# Patient Record
Sex: MALE | Race: WHITE | Employment: OTHER | URBAN - METROPOLITAN AREA
[De-identification: names, ages, dates, MRNs, and addresses within clinical notes are randomized per-mention and may not be internally consistent; named-entity substitution may affect disease eponyms.]

---

## 2017-07-19 ENCOUNTER — HOSPITAL ENCOUNTER (EMERGENCY)
Facility: HOSPITAL | Age: 32
Discharge: HOME | End: 2017-07-19
Attending: EMERGENCY MEDICINE

## 2017-07-19 ENCOUNTER — APPOINTMENT (OUTPATIENT)
Dept: GENERAL RADIOLOGY | Facility: HOSPITAL | Age: 32
End: 2017-07-19

## 2017-07-19 VITALS
TEMPERATURE: 99 F | HEART RATE: 84 BPM | WEIGHT: 155 LBS | SYSTOLIC BLOOD PRESSURE: 115 MMHG | OXYGEN SATURATION: 98 % | DIASTOLIC BLOOD PRESSURE: 66 MMHG | RESPIRATION RATE: 16 BRPM

## 2017-07-19 DIAGNOSIS — S29.011A STRAIN OF CHEST WALL, INITIAL ENCOUNTER: Primary | ICD-10-CM

## 2017-07-19 PROCEDURE — 99283 EMERGENCY DEPT VISIT LOW MDM: CPT

## 2017-07-19 PROCEDURE — 71020 XR CHEST PA + LAT CHEST (CPT=71020): CPT | Performed by: EMERGENCY MEDICINE

## 2017-07-19 RX ORDER — IBUPROFEN 600 MG/1
600 TABLET ORAL EVERY 8 HOURS PRN
Qty: 20 TABLET | Refills: 0 | Status: SHIPPED | OUTPATIENT
Start: 2017-07-19 | End: 2017-10-31 | Stop reason: ALTCHOICE

## 2017-07-19 NOTE — ED PROVIDER NOTES
Patient Seen in: [facility] Emergency Department    History   Patient presents with:  Pain (neurologic)    Stated Complaint: back and chest wall pain.  Pain increases on movement    HPI    Patient is a 29-year-old male, presenting for evaluation of l is pink and moist.  NECK: Neck is supple and nontender. The trachea is midline. LUNGS: Lungs are clear to auscultation bilaterally, respirations are unlabored. No rib point tenderness, no crepitus. HEART: Regular rate and rhythm.  There are no rubs or g continued as tolerated. Close outpatient follow-up with his primary care physician was encouraged. Patient was invited to return for reevaluation of any problems, worsening symptoms, or as discussed.     Patient verbalizes understanding and is comfort

## 2017-07-19 NOTE — ED INITIAL ASSESSMENT (HPI)
States approx 90 min ago he was lifting a heavy cabinet at home when he felt sudden left chest and rib pain. C/o pain with movement and coughing.  H/o injury to same area yrs ago but was never checked

## 2017-10-31 ENCOUNTER — APPOINTMENT (OUTPATIENT)
Dept: GENERAL RADIOLOGY | Facility: HOSPITAL | Age: 32
End: 2017-10-31
Attending: EMERGENCY MEDICINE

## 2017-10-31 ENCOUNTER — HOSPITAL ENCOUNTER (EMERGENCY)
Facility: HOSPITAL | Age: 32
Discharge: HOME | End: 2017-10-31
Attending: EMERGENCY MEDICINE

## 2017-10-31 VITALS
HEART RATE: 95 BPM | DIASTOLIC BLOOD PRESSURE: 88 MMHG | RESPIRATION RATE: 16 BRPM | WEIGHT: 145 LBS | TEMPERATURE: 97 F | SYSTOLIC BLOOD PRESSURE: 137 MMHG | OXYGEN SATURATION: 97 % | BODY MASS INDEX: 23.3 KG/M2 | HEIGHT: 66 IN

## 2017-10-31 DIAGNOSIS — S92.505A CLOSED NONDISPLACED FRACTURE OF PHALANX OF LESSER TOE OF LEFT FOOT, UNSPECIFIED PHALANX, INITIAL ENCOUNTER: Primary | ICD-10-CM

## 2017-10-31 PROCEDURE — 99283 EMERGENCY DEPT VISIT LOW MDM: CPT

## 2017-10-31 PROCEDURE — 73660 X-RAY EXAM OF TOE(S): CPT | Performed by: EMERGENCY MEDICINE

## 2017-10-31 PROCEDURE — 73630 X-RAY EXAM OF FOOT: CPT | Performed by: EMERGENCY MEDICINE

## 2017-10-31 PROCEDURE — 28510 TREATMENT OF TOE FRACTURE: CPT

## 2017-10-31 PROCEDURE — 99282 EMERGENCY DEPT VISIT SF MDM: CPT

## 2017-10-31 NOTE — ED PROVIDER NOTES
Patient Seen in: [facility] Emergency Department    History   Patient presents with:  Lower Extremity Injury (musculoskeletal)    Stated Complaint: toe injury    HPI    This is a 26-year-old male complaining of left foot and fourth toe pain since ye bit of erythema onto the dorsum of the foot. There is tenderness to palpation over the third and fourth metatarsal tips. No other bony point tenderness over the left foot or ankle. CMS is intact. SKIN: Well perfused, without cyanosis. No rashes.   NEUR There is a intra-articular fracture involving the distal metaphysis of the fourth proximal phalanx. There is a small avulsion fracture identified along the medial aspect of the base of the fourth middle phalanx.  There is a comminuted fracture to the mid to

## 2017-10-31 NOTE — ED INITIAL ASSESSMENT (HPI)
Pt had a large piece of wood fall onto his foot yesterday. Pt has swelling and discoloration of the left 4th toe with difficulty walking on it.

## 2024-05-29 ENCOUNTER — HOSPITAL ENCOUNTER (EMERGENCY)
Facility: HOSPITAL | Age: 39
Discharge: HOME | End: 2024-05-29
Attending: STUDENT IN AN ORGANIZED HEALTH CARE EDUCATION/TRAINING PROGRAM

## 2024-05-29 VITALS
DIASTOLIC BLOOD PRESSURE: 76 MMHG | OXYGEN SATURATION: 98 % | SYSTOLIC BLOOD PRESSURE: 107 MMHG | TEMPERATURE: 98 F | RESPIRATION RATE: 18 BRPM | HEART RATE: 60 BPM

## 2024-05-29 DIAGNOSIS — S61.211A LACERATION OF LEFT INDEX FINGER WITHOUT FOREIGN BODY WITHOUT DAMAGE TO NAIL, INITIAL ENCOUNTER: Primary | ICD-10-CM

## 2024-05-29 PROCEDURE — 12001 RPR S/N/AX/GEN/TRNK 2.5CM/<: CPT

## 2024-05-29 PROCEDURE — 99283 EMERGENCY DEPT VISIT LOW MDM: CPT

## 2024-05-29 PROCEDURE — 90471 IMMUNIZATION ADMIN: CPT

## 2024-05-29 PROCEDURE — 99284 EMERGENCY DEPT VISIT MOD MDM: CPT

## 2024-05-29 RX ORDER — CEPHALEXIN 500 MG/1
500 CAPSULE ORAL 4 TIMES DAILY
Qty: 28 CAPSULE | Refills: 0 | Status: SHIPPED | OUTPATIENT
Start: 2024-05-29 | End: 2024-06-05

## 2024-05-29 RX ORDER — CEPHALEXIN 500 MG/1
500 CAPSULE ORAL ONCE
Status: COMPLETED | OUTPATIENT
Start: 2024-05-29 | End: 2024-05-29

## 2024-05-29 NOTE — ED PROVIDER NOTES
History     Chief Complaint   Patient presents with    Laceration       HPI    39 year old male here with L finger lac.  Patient was chopping up some wood in his backyard when he accidentally sliced his left index finger.  Bleeding controlled with pressure.  No weakness or paresthesias.  Unknown last tetanus.  No other injuries.            History reviewed. No pertinent past medical history.    History reviewed. No pertinent surgical history.    Social History     Socioeconomic History    Marital status: Single   Tobacco Use    Smoking status: Every Day     Types: Cigarettes    Smokeless tobacco: Never   Vaping Use    Vaping status: Never Used   Substance and Sexual Activity    Alcohol use: Never    Drug use: Never                   Physical Exam     ED Triage Vitals [05/29/24 1626]   /76   Pulse 60   Resp 18   Temp 97.8 °F (36.6 °C)   Temp src    SpO2 98 %   O2 Device None (Room air)       Physical Exam  Constitutional:       General: He is in acute distress.   Musculoskeletal:      Comments: L hand  2cm linear lac to subcut tissue to volar mid phalanx 2nd digit.  No active bleeding.  Distal sensation is intact in all aspects.  Capillary refill is brisk.  Isolated DIP and PIP flexion and extension are normal.  No bony tenderness.   Neurological:      Mental Status: He is alert.              ED Course     Labs Reviewed - No data to display  No results found.        MDM     Vitals:    05/29/24 1626   BP: 107/76   Pulse: 60   Resp: 18   Temp: 97.8 °F (36.6 °C)   SpO2: 98%       Isolated finger laceration.  Tendon function appears intact.  Neurovascularly intact distally.  Wound primarily repaired.  Will start on prophylactic antibiotics given dirty wound.  Tetanus updated.    Procedure Name: Laceration Repair  Performed by self  Location: finger    Informed consent was obtained before procedure started.  PROCEDURE:  The appropriate timeout was taken. The area was prepped and draped in the usual sterile  fashion. Local anesthesia was achieved using 3cc of  Lidocaine 1% without epinephrine. The wound was copiously irrigated with saline. x5 4-0 Nylon simple interrupted sutures were placed.    Estimated blood loss was less than 0.5 mL. A dressing was applied to the area and anticipatory guidance, as well as standard post-procedure care, was explained. Return precautions are given. The patient tolerated the procedure well without complications.  Follow-up visit for suture removal and evaluation of the laceration.    Discussed all findings.  Patient is feeling well to be discharged.  Vitals remain stable.  Ambulating without difficulty.  Given written and verbal instructions regarding this condition and strict return precautions.   Will follow up in clinic.   Patient verbalizes understanding of instructions and follow up plan, as well as indications to return to the emergency department.           Disposition and Plan     Clinical Impression:  1. Laceration of left index finger without foreign body without damage to nail, initial encounter        Disposition:  Discharge    Follow-up:  pcp/urgent care/ER for wound reeval and suture removal  in 7-10 days  Follow up        Medications Prescribed:  Discharge Medication List as of 5/29/2024  5:19 PM        START taking these medications    Details   cephalexin 500 MG Oral Cap Take 1 capsule (500 mg total) by mouth 4 (four) times daily for 7 days., Print, Disp-28 capsule, R-0

## 2024-05-29 NOTE — ED INITIAL ASSESSMENT (HPI)
Laceration on left index finger from the  1 hour ago no active bleeding . Not on any blood thinner

## 2025-04-02 ENCOUNTER — HOSPITAL ENCOUNTER (EMERGENCY)
Facility: HOSPITAL | Age: 40
Discharge: LEFT BEFORE DISCHARGE | End: 2025-04-02

## (undated) NOTE — ED AVS SNAPSHOT
[facility] Emergency Department  [address] [place] [address]  Phone:  [phone]  Fax:  [phone]          Keila Bowers   MRN: [id number]    Department:  [facility] Emergency Department   Date of Visit:  7/19/2017 CARE PHYSICIAN AT ONCE OR RETURN IMMEDIATELY TO THE EMERGENCY DEPARTMENT.     If you have been prescribed any medication(s), please fill your prescription right away and begin taking the medication(s) as directed    If the emergency physician has read X-ray

## (undated) NOTE — ED AVS SNAPSHOT
Netta Anna   MRN: [id number]    Department:  [facility] Emergency Department   Date of Visit:  10/31/2017           Disclosure     Insurance plans vary and the physician(s) referred by the ER may not be covered by your plan.  Please contact your If you have been prescribed any medication(s), please fill your prescription right away and begin taking the medication(s) as directed    If the emergency physician has read X-rays, these will be re-interpreted by a radiologist.  If there is a significant